# Patient Record
Sex: FEMALE | URBAN - METROPOLITAN AREA
[De-identification: names, ages, dates, MRNs, and addresses within clinical notes are randomized per-mention and may not be internally consistent; named-entity substitution may affect disease eponyms.]

---

## 2022-02-08 ENCOUNTER — OFFICE VISIT (OUTPATIENT)
Dept: URBAN - METROPOLITAN AREA CLINIC 25 | Facility: CLINIC | Age: 82
End: 2022-02-08
Payer: COMMERCIAL

## 2022-02-08 VITALS
BODY MASS INDEX: 27.31 KG/M2 | SYSTOLIC BLOOD PRESSURE: 135 MMHG | TEMPERATURE: 96.8 F | HEIGHT: 64 IN | WEIGHT: 160 LBS | HEART RATE: 61 BPM | DIASTOLIC BLOOD PRESSURE: 75 MMHG

## 2022-02-08 DIAGNOSIS — R93.3 ABNORMAL CT SCAN, COLON: ICD-10-CM

## 2022-02-08 DIAGNOSIS — R10.30 LOWER ABDOMINAL PAIN: ICD-10-CM

## 2022-02-08 PROCEDURE — 99204 OFFICE O/P NEW MOD 45 MIN: CPT | Performed by: INTERNAL MEDICINE

## 2022-02-08 RX ORDER — POLYETHYLENE GLYCOL 3350, SODIUM CHLORIDE, SODIUM BICARBONATE, POTASSIUM CHLORIDE 420; 11.2; 5.72; 1.48 G/4L; G/4L; G/4L; G/4L
AS DIRECTED POWDER, FOR SOLUTION ORAL ONCE
Qty: 1 BOTTLE | Refills: 0 | OUTPATIENT
Start: 2022-02-08 | End: 2022-02-09

## 2022-02-08 RX ORDER — DICYCLOMINE HYDROCHLORIDE 20 MG/1
1- 2 TABLETS TABLET ORAL
Qty: 60 | Refills: 2 | OUTPATIENT
Start: 2022-02-08 | End: 2022-05-09

## 2022-02-08 NOTE — HPI-TODAY'S VISIT:
The patient was referred by Dr. Ines Grossman for abdominal pain/ER follow up .   A copy of this document is being forwarded to the referring provider.  She has a lower abdominal pain/discomfort intermittently over the past year.  When she gets the pain it can last fro a few weeks.  The pain is a cramping pain.  There are no obvious triggers.  It does decrease when she eats.  Over the past few eeks it has goten worse.  She went to the ED on 2/1   ER records were reviewed in clinic today.  CT Scan showed wall thickening of the SC and possible wall thickening of the stomach.  Labs were normal.  She was d/jenn on PP iwith only mild relief.  She has regular BM's without constipaiton or diarrhea.  The pain isn not related to her BM's.  She denies LGI Bleed or melena.  She denies arnoexia or weight loss.  She denies upper abdominal pain.  She denies UGI symptoms.  She had an umbilical hernia repair last year.  The pain never imprved from the surgery.  Her last colonoscopy was 5 years ago by Dr. Hendrickson.  She has not been on Bentyl

## 2022-02-08 NOTE — PHYSICAL EXAM CARDIOVASCULAR:
no edema,  no murmurs,  regular rate and rhythm , no edema. Normal rate, regular rhythm.  Heart sounds S1, S2.  No murmurs, rubs or gallops.

## 2022-02-14 ENCOUNTER — OFFICE VISIT (OUTPATIENT)
Dept: URBAN - METROPOLITAN AREA SURGERY CENTER 20 | Facility: SURGERY CENTER | Age: 82
End: 2022-02-14
Payer: COMMERCIAL

## 2022-02-14 ENCOUNTER — CLAIMS CREATED FROM THE CLAIM WINDOW (OUTPATIENT)
Dept: URBAN - METROPOLITAN AREA CLINIC 4 | Facility: CLINIC | Age: 82
End: 2022-02-14
Payer: COMMERCIAL

## 2022-02-14 ENCOUNTER — TELEPHONE ENCOUNTER (OUTPATIENT)
Dept: URBAN - METROPOLITAN AREA CLINIC 92 | Facility: CLINIC | Age: 82
End: 2022-02-14

## 2022-02-14 DIAGNOSIS — R93.3 ABN FINDINGS-GI TRACT: ICD-10-CM

## 2022-02-14 DIAGNOSIS — D12.3 ADENOMA OF TRANSVERSE COLON: ICD-10-CM

## 2022-02-14 DIAGNOSIS — D12.3 BENIGN NEOPLASM OF TRANSVERSE COLON: ICD-10-CM

## 2022-02-14 PROCEDURE — G8907 PT DOC NO EVENTS ON DISCHARG: HCPCS | Performed by: INTERNAL MEDICINE

## 2022-02-14 PROCEDURE — 88305 TISSUE EXAM BY PATHOLOGIST: CPT | Performed by: PATHOLOGY

## 2022-02-14 PROCEDURE — 45385 COLONOSCOPY W/LESION REMOVAL: CPT | Performed by: INTERNAL MEDICINE

## 2022-02-14 RX ORDER — DICYCLOMINE HYDROCHLORIDE 20 MG/1
1- 2 TABLETS TABLET ORAL
Qty: 60 | Refills: 2 | Status: ACTIVE | COMMUNITY
Start: 2022-02-08 | End: 2022-05-09

## 2022-05-10 ENCOUNTER — OFFICE VISIT (OUTPATIENT)
Dept: URBAN - METROPOLITAN AREA CLINIC 25 | Facility: CLINIC | Age: 82
End: 2022-05-10
Payer: COMMERCIAL

## 2022-05-10 DIAGNOSIS — R10.30 LOWER ABDOMINAL PAIN: ICD-10-CM

## 2022-05-10 DIAGNOSIS — R93.3 ABNORMAL CT SCAN, COLON: ICD-10-CM

## 2022-05-10 PROCEDURE — 99213 OFFICE O/P EST LOW 20 MIN: CPT | Performed by: INTERNAL MEDICINE

## 2022-05-10 NOTE — HPI-TODAY'S VISIT:
COlonoscopy on 2/14 was normal except for a small TA.  Overall she feels well.  She denies recurent abdominal pain.  She only took the Bentyl twice.  She denies arneoxia or weight loss.  She denies LGI symptoms.  She denies LGI bleed or melena.  She takes Colace with good response.  She denies GERD/N/V/dysphagia.  She is satisfied with her symptoms.  Her only complaint is an occasional sense of incomplete evacuation

## 2022-05-10 NOTE — PHYSICAL EXAM GASTROINTESTINAL
Abdomen , soft, nontender, nondistended , no guarding or rigidity , no masses palpable , normal bowel sounds , Liver and Spleen,  no hepatosplenomegaly , liver nontender

## 2022-08-01 ENCOUNTER — DASHBOARD ENCOUNTERS (OUTPATIENT)
Age: 82
End: 2022-08-01

## 2022-08-01 ENCOUNTER — OFFICE VISIT (OUTPATIENT)
Dept: URBAN - METROPOLITAN AREA TELEHEALTH 2 | Facility: TELEHEALTH | Age: 82
End: 2022-08-01
Payer: COMMERCIAL

## 2022-08-01 VITALS
HEART RATE: 62 BPM | BODY MASS INDEX: 26.98 KG/M2 | HEIGHT: 64 IN | WEIGHT: 158 LBS | DIASTOLIC BLOOD PRESSURE: 82 MMHG | SYSTOLIC BLOOD PRESSURE: 135 MMHG | TEMPERATURE: 97.5 F

## 2022-08-01 DIAGNOSIS — R10.33 PERIUMBILICAL ABDOMINAL CRAMPING: ICD-10-CM

## 2022-08-01 PROCEDURE — 99443 PHONE E/M BY PHYS 21-30 MIN: CPT | Performed by: INTERNAL MEDICINE

## 2022-08-01 NOTE — HPI-TODAY'S VISIT:
Patient last seen 5/2022.  She was doing well unti last week when she developed recurrent abdominal cramping pain.  This is  periumbilical.  She did nto respond to Bentyl.  The pain is daily.  There are no obvious triggers.  It improves when she lays down.  The pain is constant.  She denies anreoxia or weight loss.  She is still on Colace.  She has a daily BM.  She empties well and denies strain.  She denies LGI bleed or melena.  The pain does not sseem to renny related to her BM's or flatus.  She denies UI symptoms.  The pain has not changed over the past week.  This is identical to her prior pain

## 2025-05-20 ENCOUNTER — TELEPHONE ENCOUNTER (OUTPATIENT)
Dept: URBAN - METROPOLITAN AREA CLINIC 109 | Facility: CLINIC | Age: 85
End: 2025-05-20